# Patient Record
Sex: MALE | Race: WHITE
[De-identification: names, ages, dates, MRNs, and addresses within clinical notes are randomized per-mention and may not be internally consistent; named-entity substitution may affect disease eponyms.]

---

## 2019-08-01 ENCOUNTER — HOSPITAL ENCOUNTER (OUTPATIENT)
Dept: HOSPITAL 47 - RADCTMAIN | Age: 79
Discharge: HOME | End: 2019-08-01
Attending: INTERNAL MEDICINE
Payer: MEDICARE

## 2019-08-01 DIAGNOSIS — C67.9: ICD-10-CM

## 2019-08-01 DIAGNOSIS — K42.9: ICD-10-CM

## 2019-08-01 DIAGNOSIS — R91.8: Primary | ICD-10-CM

## 2019-08-01 DIAGNOSIS — Z88.3: ICD-10-CM

## 2019-08-01 DIAGNOSIS — R18.8: ICD-10-CM

## 2019-08-01 PROCEDURE — 74176 CT ABD & PELVIS W/O CONTRAST: CPT

## 2019-08-01 PROCEDURE — 71250 CT THORAX DX C-: CPT

## 2019-08-01 NOTE — CT
EXAMINATION TYPE: CT ChestAbdPelvis wo con

 

DATE OF EXAM: 8/1/2019

 

COMPARISON: 4/6/2017 CT abdomen pelvis. There are no prior CT of the chest at this institution.

 

HISTORY: History of bladder cancer with nephrectomies and prostatectomy.   Lung cancer.

 

CT DLP: 522.4 mGycm. Automated Exposure Control for Dose Reduction was Utilized.

 

TECHNIQUE: 

CT scan of the thorax, abdomen and pelvis is performed without IV contrast.

 

FINDINGS:

 

LUNGS: Biapical pleural thickening. Central bronchiectasis noted. There is a large mass in the left u
pper lobe pleural-based measuring 3.5 cm. There is an additional left upper lobe mass noted pleural-b
ased measuring 1.2 cm. Calcified granuloma in the superior segment right lower lobe. There is an jose martin
tional large mass right upper lobe measuring 3.3 cm. In the right lower lobe there are 2 masses the l
argest of which measures 3.3 cm. Interlobular septal thickening is noted. Lobulated 1 cm lesion along
 the anterior medial margin of the left lung base is indeterminate. No consolidation, no pleural effu
aida. No pneumothorax.

 

MEDIASTINUM: There are no greater than 1 cm hilar or mediastinal lymph nodes.   No pericardial effusi
on is seen.  Small hiatal hernia noted. Coronary artery calcification seen and there is atherosclerot
ic change of the aorta. Mild cardiomegaly.

 

OTHER:  No additional significant abnormality is seen.

 

LIVER/GB: No significant abnormality is appreciated.

 

PANCREAS: No significant abnormality is seen.

 

SPLEEN: Splenic granuloma noted.

 

ADRENALS: No significant abnormality is seen.

 

KIDNEYS: No significant abnormality is seen.

 

BOWEL: Bowel gas pattern nonspecific. Changes of diverticulosis noted..

 

LYMPH NODES: No greater than 1cm abdominal or pelvic lymph nodes are appreciated.

 

OSSEOUS STRUCTURES: Hypertrophic and degenerative changes of the spine noted with multilevel severe d
egenerative disc disease. Sclerotic focus involving the right humeral head is indeterminate and too s
mall to characterize. Also is a sclerotic focus involving the sacrococcygeal junction which also is t
oo small to characterize. Single sclerotic focus in the T12 vertebral body also too small to landry shrestha. Grade 1 anterolisthesis L4 on L5..

 

OTHER: There is a fat-containing periumbilical hernia. There is an intra-abdominal catheter noted. Th
ere is moderate ascites. Aorta of normal caliber. Fluid within the anterior subcutaneous tissues like
ly related to hernia and representing extension of intra-abdominal ascites. Measures 2 Hounsfield uni
ts. Small bubbles of free intraperitoneal air noted.

 

IMPRESSION:

1. Multiple bilateral large pulmonary masses suggestive of metastases.

2. Ascites with the intra-abdominal catheter also noted.

3. Diverticulosis with no CT evidence of diverticulitis.

4. Anterior abdominal wall periumbilical hernia

5. There appear to be small scattered diffuse areas of free intraperitoneal air likely related to the
 peritoneal catheter.

 

A Yellow level critical message alert has been initiated for Gaurav Joseph MD via the [employer] on 8/1/2019 12:49 PM.  This message alert has been sent to Gaurav Joseph MD via 
e preferences provided by the clinician for the receipt of Radiology Critical Findings. Message ID [id number]
[id number].

## 2020-02-19 ENCOUNTER — HOSPITAL ENCOUNTER (INPATIENT)
Dept: HOSPITAL 47 - 3SCARD | Age: 80
LOS: 3 days | DRG: 951 | End: 2020-02-22
Attending: FAMILY MEDICINE | Admitting: FAMILY MEDICINE
Payer: MEDICAID

## 2020-02-19 DIAGNOSIS — I95.9: ICD-10-CM

## 2020-02-19 DIAGNOSIS — Z85.820: ICD-10-CM

## 2020-02-19 DIAGNOSIS — C64.9: ICD-10-CM

## 2020-02-19 DIAGNOSIS — Z85.54: ICD-10-CM

## 2020-02-19 DIAGNOSIS — R63.4: ICD-10-CM

## 2020-02-19 DIAGNOSIS — R53.83: ICD-10-CM

## 2020-02-19 DIAGNOSIS — I12.0: ICD-10-CM

## 2020-02-19 DIAGNOSIS — I48.91: ICD-10-CM

## 2020-02-19 DIAGNOSIS — R19.7: ICD-10-CM

## 2020-02-19 DIAGNOSIS — Z92.21: ICD-10-CM

## 2020-02-19 DIAGNOSIS — E78.5: ICD-10-CM

## 2020-02-19 DIAGNOSIS — Z91.041: ICD-10-CM

## 2020-02-19 DIAGNOSIS — Z85.528: ICD-10-CM

## 2020-02-19 DIAGNOSIS — R11.10: ICD-10-CM

## 2020-02-19 DIAGNOSIS — C43.62: ICD-10-CM

## 2020-02-19 DIAGNOSIS — C78.00: ICD-10-CM

## 2020-02-19 DIAGNOSIS — N18.6: ICD-10-CM

## 2020-02-19 DIAGNOSIS — D61.818: ICD-10-CM

## 2020-02-19 DIAGNOSIS — Z85.51: ICD-10-CM

## 2020-02-19 DIAGNOSIS — Z51.5: Primary | ICD-10-CM

## 2020-02-19 RX ADMIN — MORPHINE SULFATE PRN MG: 2 INJECTION, SOLUTION INTRAMUSCULAR; INTRAVENOUS at 16:39

## 2020-02-20 RX ADMIN — MORPHINE SULFATE PRN MG: 2 INJECTION, SOLUTION INTRAMUSCULAR; INTRAVENOUS at 17:15

## 2020-02-20 NOTE — P.HPIM
History of Present Illness


H&P Date: 02/20/20





An 79 year old male transferred to hospice care diagnosis Metastatic Urothelial 

Cancer with lung metastasis failed chemotherapy








Review of Systems


Constitutional: Reports anorexia, Reports fatigue, Reports lethargy, Reports 

malaise, Reports poor appetite, Reports weakness, Reports weight loss


Gastrointestinal: Reports abdominal pain, Reports diarrhea, Reports nausea, 

Reports vomiting





Past Medical History


Past Medical History: Atrial Fibrillation, Cancer, Hyperlipidemia, Hypertension,

Renal Disease


Additional Past Medical History / Comment(s): hx of bladder, kidney, uretral CA,

last chemo approx feb 11th for lung cancer, melanoma left shoulder, left 

mediport placed on [facility], [place] 2019


History of Any Multi-Drug Resistant Organisms: None Reported


Past Surgical History: Bladder Surgery


Additional Past Surgical History / Comment(s): states "bladder removed" 

nephrostomy tube,  nephrouretectomy left, nephrolithotomy, transurethral 

resection bladder tumor, ureteroscopy and laser lithotripsy, colonoscopies.  had

bladder, both kidneys, and prostate removed


Past Anesthesia/Blood Transfusion Reactions: No Reported Reaction


Past Psychological History: No Psychological Hx Reported


Smoking Status: Never smoker


Past Alcohol Use History: None Reported


Past Drug Use History: None Reported





- Past Family History


  ** Father


Family Medical History: Cancer


Additional Family Medical History / Comment(s): rectal





Medications and Allergies


                                    Allergies











Allergy/AdvReac Type Severity Reaction Status Date / Time


 


Iodinated Contrast Media Allergy  tongue Verified 02/19/20 17:10





[Iodinated Contrast- Oral   Swelling  





and IV Dye]     


 


mirabegron Allergy  Unknown Uncoded 02/13/20 16:09














Physical Exam


Vitals: 


                                   Vital Signs











  Temp Pulse Resp BP Pulse Ox


 


 02/20/20 12:00   125 H  16  73/52  84 L


 


 02/20/20 11:55    16  


 


 02/20/20 08:39  98.1 F  87  16  68/39  89 L


 


 02/20/20 08:12   66  20  


 


 02/20/20 08:00  100 F H  68  20  61/40 


 


 02/20/20 07:38  100 F H  68  20  61/40  90 L


 


 02/19/20 23:00    18  


 


 02/19/20 20:00   88  18  


 


 02/19/20 16:40   110 H   








                                Intake and Output











 02/20/20 02/20/20 02/20/20





 06:59 14:59 22:59


 


Output Total  0 


 


Balance  0 


 


Output:   


 


  Urine  0 


 


Other:   


 


  # Bowel Movements  1 


 


  Weight 77.5 kg  














- Constitutional


General appearance: mild distress, thin





- EENT


Eyes: PERRLA





- Musculoskeletal


Musculoskeletal: generalized weakness





- Psychiatric





arouses to verbal command





Assessment and Plan


Plan: 





ASSESSMENT:


Hospice care: Metastatic Urothelial Cancer


ESRD


Hypotension


Pancytopenia





PLAN:


Continue comfort measures with Hospice services

## 2020-02-21 VITALS — HEART RATE: 63 BPM | RESPIRATION RATE: 16 BRPM

## 2020-02-21 VITALS — SYSTOLIC BLOOD PRESSURE: 58 MMHG | DIASTOLIC BLOOD PRESSURE: 38 MMHG | TEMPERATURE: 97.8 F

## 2020-02-21 NOTE — P.PN
Subjective


79-year-old male with a metastatic urothelial cancer patient is admitted as 

inpatient hospice.  Patient is on morphine drip and comfort medications patient 

doesn't appear to be in discomfort at this time comfortable but barely arousable

at this time.








Objective





- Vital Signs


Vital signs: 


                                   Vital Signs











Temp  98.1 F   02/20/20 08:39


 


Pulse  94   02/21/20 08:05


 


Resp  14   02/21/20 08:05


 


BP  63/43   02/20/20 23:00


 


Pulse Ox  85 L  02/20/20 23:00








                                 Intake & Output











 02/20/20 02/21/20 02/21/20





 18:59 06:59 18:59


 


Intake Total 0  


 


Output Total 0  


 


Balance 0  


 


Intake:   


 


  Oral 0  


 


Output:   


 


  Urine 0  


 


Other:   


 


  Voiding Method Diaper Diaper 





 Incontinent Incontinent 


 


  # Voids 0  


 


  # Bowel Movements 1  














- Exam








PHYSICAL EXAMINATION: 





GENERAL: Patient is comfortable difficult to arouse, dry mucous membranes





CARDIOVASCULAR: S1 and S2 present. No murmurs, rubs, or gallops. 


PULMONARY: Rhonchi


ABDOMEN: Soft, 


MUSCULOSKELETAL: No joint swelling or deformity.


EXTREMITIES: No cyanosis, clubbing, or pedal edema. 


NEUROLOGICAL: Unable to assess


SKIN: No rashes. 

















Assessment and Plan


Plan: 


Hospice care: Metastatic Urothelial Cancer


ESRD


Hypotension


Pancytopenia





Continue hospice measures patient is comfortable at this time. none

## 2020-02-22 NOTE — P.PN
Subjective


79-year-old male with a metastatic urothelial cancer patient is admitted as 

inpatient hospice.  Patient is on morphine drip and comfort medications patient 

doesn't appear to be in discomfort at this time comfortable but barely arousable

at this time.





02/22/2020


Patient is comfortable no overnight events








Objective





- Vital Signs


Vital signs: 


                                   Vital Signs











Temp  97.8 F   02/21/20 12:00


 


Pulse  63   02/21/20 17:07


 


Resp  16   02/21/20 17:07


 


BP  58/38   02/21/20 12:00


 


Pulse Ox  96   02/21/20 17:07








                                 Intake & Output











 02/21/20 02/22/20 02/22/20





 18:59 06:59 18:59


 


Intake Total 0  


 


Output Total 1  


 


Balance -1  


 


Intake:   


 


  Oral 0  


 


Output:   


 


  Urine 1  


 


  Stool 0  


 


Other:   


 


  Voiding Method Diaper Diaper Diaper





 Incontinent Incontinent Incontinent


 


  # Voids 0 0 


 


  # Bowel Movements 1  














- Exam








PHYSICAL EXAMINATION: 





GENERAL: Patient is comfortable difficult to arouse, dry mucous membranes





CARDIOVASCULAR: S1 and S2 present. No murmurs, rubs, or gallops. 


PULMONARY: Rhonchi


ABDOMEN: Soft, 


MUSCULOSKELETAL: No joint swelling or deformity.


EXTREMITIES: No cyanosis, clubbing, or pedal edema. 


NEUROLOGICAL: Unable to assess


SKIN: No rashes. 

















Assessment and Plan


Plan: 


Hospice care: Metastatic Urothelial Cancer


ESRD


Hypotension


Pancytopenia





Continue hospice measures patient is comfortable at this time.

## 2020-02-22 NOTE — P.DS
Providers


Date of admission: 


20 16:24





Attending physician: 


Lamonte Carlson





Primary care physician: 


Lamonte Carlson





Hospital Course: 


Patient  today at 1505 .  Primary cause of death renal cancer








Plan - Discharge Summary





- Preliminary Cause of Death


Preliminary Cause of Death: Metastatic renal cancer